# Patient Record
Sex: MALE | Race: WHITE | NOT HISPANIC OR LATINO | Employment: FULL TIME | URBAN - METROPOLITAN AREA
[De-identification: names, ages, dates, MRNs, and addresses within clinical notes are randomized per-mention and may not be internally consistent; named-entity substitution may affect disease eponyms.]

---

## 2020-02-10 ENCOUNTER — APPOINTMENT (OUTPATIENT)
Dept: GENERAL RADIOLOGY | Facility: HOSPITAL | Age: 55
End: 2020-02-10

## 2020-02-10 ENCOUNTER — HOSPITAL ENCOUNTER (EMERGENCY)
Facility: HOSPITAL | Age: 55
Discharge: HOME | End: 2020-02-10
Attending: EMERGENCY MEDICINE | Admitting: EMERGENCY MEDICINE

## 2020-02-10 VITALS
TEMPERATURE: 97.6 F | WEIGHT: 150 LBS | DIASTOLIC BLOOD PRESSURE: 80 MMHG | SYSTOLIC BLOOD PRESSURE: 117 MMHG | HEIGHT: 68 IN | OXYGEN SATURATION: 97 % | RESPIRATION RATE: 18 BRPM | HEART RATE: 65 BPM | BODY MASS INDEX: 22.73 KG/M2

## 2020-02-10 DIAGNOSIS — F43.0 STRESS RESPONSE: ICD-10-CM

## 2020-02-10 DIAGNOSIS — F41.9 ANXIETY: ICD-10-CM

## 2020-02-10 DIAGNOSIS — R20.2 PARESTHESIAS: Primary | ICD-10-CM

## 2020-02-10 LAB
ALBUMIN SERPL-MCNC: 4.5 G/DL (ref 3.5–5.2)
ALBUMIN/GLOB SERPL: 1.5 G/DL
ALP SERPL-CCNC: 78 U/L (ref 39–117)
ALT SERPL W P-5'-P-CCNC: 27 U/L (ref 1–41)
ANION GAP SERPL CALCULATED.3IONS-SCNC: 9 MMOL/L (ref 5–15)
AST SERPL-CCNC: 23 U/L (ref 1–40)
BASOPHILS # BLD AUTO: 0.03 10*3/MM3 (ref 0–0.2)
BASOPHILS NFR BLD AUTO: 0.6 % (ref 0–1.5)
BILIRUB SERPL-MCNC: 0.5 MG/DL (ref 0.2–1.2)
BUN BLD-MCNC: 13 MG/DL (ref 6–20)
BUN/CREAT SERPL: 12.9 (ref 7–25)
CALCIUM SPEC-SCNC: 8.8 MG/DL (ref 8.6–10.5)
CHLORIDE SERPL-SCNC: 101 MMOL/L (ref 98–107)
CO2 SERPL-SCNC: 30 MMOL/L (ref 22–29)
CREAT BLD-MCNC: 1.01 MG/DL (ref 0.76–1.27)
DEPRECATED RDW RBC AUTO: 41.2 FL (ref 37–54)
EOSINOPHIL # BLD AUTO: 0.06 10*3/MM3 (ref 0–0.4)
EOSINOPHIL NFR BLD AUTO: 1.2 % (ref 0.3–6.2)
ERYTHROCYTE [DISTWIDTH] IN BLOOD BY AUTOMATED COUNT: 11.9 % (ref 12.3–15.4)
GFR SERPL CREATININE-BSD FRML MDRD: 78 ML/MIN/1.73
GLOBULIN UR ELPH-MCNC: 3 GM/DL
GLUCOSE BLD-MCNC: 88 MG/DL (ref 65–99)
HCT VFR BLD AUTO: 43.4 % (ref 37.5–51)
HGB BLD-MCNC: 15.3 G/DL (ref 13–17.7)
HOLD SPECIMEN: NORMAL
HOLD SPECIMEN: NORMAL
IMM GRANULOCYTES # BLD AUTO: 0.01 10*3/MM3 (ref 0–0.05)
IMM GRANULOCYTES NFR BLD AUTO: 0.2 % (ref 0–0.5)
LYMPHOCYTES # BLD AUTO: 1.92 10*3/MM3 (ref 0.7–3.1)
LYMPHOCYTES NFR BLD AUTO: 37.9 % (ref 19.6–45.3)
MCH RBC QN AUTO: 33.1 PG (ref 26.6–33)
MCHC RBC AUTO-ENTMCNC: 35.3 G/DL (ref 31.5–35.7)
MCV RBC AUTO: 93.9 FL (ref 79–97)
MONOCYTES # BLD AUTO: 0.59 10*3/MM3 (ref 0.1–0.9)
MONOCYTES NFR BLD AUTO: 11.6 % (ref 5–12)
NEUTROPHILS # BLD AUTO: 2.46 10*3/MM3 (ref 1.7–7)
NEUTROPHILS NFR BLD AUTO: 48.5 % (ref 42.7–76)
NRBC BLD AUTO-RTO: 0 /100 WBC (ref 0–0.2)
NT-PROBNP SERPL-MCNC: <5 PG/ML (ref 5–900)
PLATELET # BLD AUTO: 184 10*3/MM3 (ref 140–450)
PMV BLD AUTO: 9.2 FL (ref 6–12)
POTASSIUM BLD-SCNC: 3.4 MMOL/L (ref 3.5–5.2)
PROT SERPL-MCNC: 7.5 G/DL (ref 6–8.5)
RBC # BLD AUTO: 4.62 10*6/MM3 (ref 4.14–5.8)
SODIUM BLD-SCNC: 140 MMOL/L (ref 136–145)
TROPONIN T SERPL-MCNC: <0.01 NG/ML (ref 0–0.03)
WBC NRBC COR # BLD: 5.07 10*3/MM3 (ref 3.4–10.8)
WHOLE BLOOD HOLD SPECIMEN: NORMAL
WHOLE BLOOD HOLD SPECIMEN: NORMAL

## 2020-02-10 PROCEDURE — 85025 COMPLETE CBC W/AUTO DIFF WBC: CPT | Performed by: EMERGENCY MEDICINE

## 2020-02-10 PROCEDURE — 93005 ELECTROCARDIOGRAM TRACING: CPT | Performed by: EMERGENCY MEDICINE

## 2020-02-10 PROCEDURE — 99284 EMERGENCY DEPT VISIT MOD MDM: CPT

## 2020-02-10 PROCEDURE — 71045 X-RAY EXAM CHEST 1 VIEW: CPT

## 2020-02-10 PROCEDURE — 83880 ASSAY OF NATRIURETIC PEPTIDE: CPT | Performed by: EMERGENCY MEDICINE

## 2020-02-10 PROCEDURE — 84484 ASSAY OF TROPONIN QUANT: CPT | Performed by: EMERGENCY MEDICINE

## 2020-02-10 PROCEDURE — 80053 COMPREHEN METABOLIC PANEL: CPT | Performed by: EMERGENCY MEDICINE

## 2020-02-10 RX ORDER — SODIUM CHLORIDE 0.9 % (FLUSH) 0.9 %
10 SYRINGE (ML) INJECTION AS NEEDED
Status: DISCONTINUED | OUTPATIENT
Start: 2020-02-10 | End: 2020-02-10 | Stop reason: HOSPADM

## 2020-02-10 RX ORDER — HYDROXYZINE PAMOATE 50 MG/1
50 CAPSULE ORAL 3 TIMES DAILY PRN
Qty: 20 CAPSULE | Refills: 0 | Status: SHIPPED | OUTPATIENT
Start: 2020-02-10 | End: 2022-09-25

## 2020-02-10 NOTE — DISCHARGE INSTRUCTIONS
Take hydroxyzine as needed for anxiety.      Rest and drink plenty of fluids.     Follow-up with PCP for recheck within next week.     Engage in activity to minimize stress.

## 2020-02-10 NOTE — ED PROVIDER NOTES
Subjective   Mr. Hiram Stuart is a 54 y.o. male who presents to the ED with c/o shortness of breath. He reports waking up with congestion and itchy eyes. He notes that the pruritus began yesterday, but notes new current symptoms of dry mouth, tingling lips, and diaphoresis. He took a 24-hour Claritin at 0515 this morning. He also complains of lightheadedness, palpitations, generalized weakness, and tremors, but denies vomiting, diarrhea, blood in stool, chest pain, and fever. He reports that he ate chicken yesterday, cooked in peanut oil and believes this could be an allergic reaction. Notes constant stress at work, He denies a history of blood clots and heart attack. He notes a hernia repair and kidney surgery, but denies having a cardiac stent placed. There are no other acute complaints at this time.        History provided by:  Patient   used: No    Shortness of Breath   Severity:  Moderate  Onset quality:  Sudden  Duration:  2 hours  Timing:  Constant  Progression:  Unable to specify  Chronicity:  New  Relieved by:  Nothing  Worsened by:  Nothing  Ineffective treatments: Claritin.  Associated symptoms: no chest pain, no fever and no vomiting    Risk factors: no recent alcohol use, no hx of PE/DVT and no tobacco use        Review of Systems   Constitutional: Negative for fever.   HENT:        Positive for frequent bowel movements.   Respiratory: Positive for shortness of breath.    Cardiovascular: Positive for palpitations. Negative for chest pain.   Gastrointestinal: Negative for blood in stool, diarrhea and vomiting.   Neurological: Positive for tremors, weakness (generalized) and light-headedness.        Positive for itchiness (eyes)  Positive for tingling (lips, hands)   All other systems reviewed and are negative.      Past Medical History:   Diagnosis Date   • Anxiety    • Hyperlipidemia        Allergies   Allergen Reactions   • Penicillins Shortness Of Breath   • Augmentin  [Amoxicillin-Pot Clavulanate] Rash   • Bactrim [Sulfamethoxazole-Trimethoprim] Rash   • Bentyl [Dicyclomine Hcl] Rash   • Contrast Dye Rash   • Zithromax [Azithromycin] Rash       History reviewed. No pertinent surgical history.    History reviewed. No pertinent family history.    Social History     Socioeconomic History   • Marital status:      Spouse name: Not on file   • Number of children: Not on file   • Years of education: Not on file   • Highest education level: Not on file   Tobacco Use   • Smoking status: Never Smoker   • Smokeless tobacco: Never Used   Substance and Sexual Activity   • Alcohol use: Not Currently   • Drug use: Not Currently         Objective   Physical Exam   Constitutional: He is oriented to person, place, and time. He appears well-developed and well-nourished. No distress.   HENT:   Head: Normocephalic and atraumatic.   Nose: Nose normal.   Eyes: Conjunctivae are normal. No scleral icterus.   Neck: Normal range of motion. Neck supple.   Cardiovascular: Normal rate, regular rhythm and normal heart sounds.   No murmur heard.  Pulmonary/Chest: Effort normal and breath sounds normal. No respiratory distress.   No edema of the lower extremities.   There is equal calf size.   Abdominal: Soft. There is no tenderness.   Musculoskeletal: Normal range of motion.   Neurological: He is alert and oriented to person, place, and time.   Skin: Skin is warm and dry.   Psychiatric: His behavior is normal. His mood appears anxious.   The patient is tremulous and anxious appearing.   Nursing note and vitals reviewed.      Procedures         ED Course  ED Course as of Feb 10 1136   Mon Feb 10, 2020   1041 Dr. Dhaliwal is at bedside reevaluating and updating the patient.     [KO]      ED Course User Index  [KO] Tomasa Jackson     Recent Results (from the past 24 hour(s))   Comprehensive Metabolic Panel    Collection Time: 02/10/20  8:37 AM   Result Value Ref Range    Glucose 88 65 - 99 mg/dL    BUN 13  6 - 20 mg/dL    Creatinine 1.01 0.76 - 1.27 mg/dL    Sodium 140 136 - 145 mmol/L    Potassium 3.4 (L) 3.5 - 5.2 mmol/L    Chloride 101 98 - 107 mmol/L    CO2 30.0 (H) 22.0 - 29.0 mmol/L    Calcium 8.8 8.6 - 10.5 mg/dL    Total Protein 7.5 6.0 - 8.5 g/dL    Albumin 4.50 3.50 - 5.20 g/dL    ALT (SGPT) 27 1 - 41 U/L    AST (SGOT) 23 1 - 40 U/L    Alkaline Phosphatase 78 39 - 117 U/L    Total Bilirubin 0.5 0.2 - 1.2 mg/dL    eGFR Non African Amer 78 >60 mL/min/1.73    Globulin 3.0 gm/dL    A/G Ratio 1.5 g/dL    BUN/Creatinine Ratio 12.9 7.0 - 25.0    Anion Gap 9.0 5.0 - 15.0 mmol/L   BNP    Collection Time: 02/10/20  8:37 AM   Result Value Ref Range    proBNP <5.0 (L) 5.0 - 900.0 pg/mL   Troponin    Collection Time: 02/10/20  8:37 AM   Result Value Ref Range    Troponin T <0.010 0.000 - 0.030 ng/mL   Light Blue Top    Collection Time: 02/10/20  8:37 AM   Result Value Ref Range    Extra Tube hold for add-on    Green Top (Gel)    Collection Time: 02/10/20  8:37 AM   Result Value Ref Range    Extra Tube Hold for add-ons.    Lavender Top    Collection Time: 02/10/20  8:37 AM   Result Value Ref Range    Extra Tube hold for add-on    Gold Top - SST    Collection Time: 02/10/20  8:37 AM   Result Value Ref Range    Extra Tube Hold for add-ons.    CBC Auto Differential    Collection Time: 02/10/20  8:37 AM   Result Value Ref Range    WBC 5.07 3.40 - 10.80 10*3/mm3    RBC 4.62 4.14 - 5.80 10*6/mm3    Hemoglobin 15.3 13.0 - 17.7 g/dL    Hematocrit 43.4 37.5 - 51.0 %    MCV 93.9 79.0 - 97.0 fL    MCH 33.1 (H) 26.6 - 33.0 pg    MCHC 35.3 31.5 - 35.7 g/dL    RDW 11.9 (L) 12.3 - 15.4 %    RDW-SD 41.2 37.0 - 54.0 fl    MPV 9.2 6.0 - 12.0 fL    Platelets 184 140 - 450 10*3/mm3    Neutrophil % 48.5 42.7 - 76.0 %    Lymphocyte % 37.9 19.6 - 45.3 %    Monocyte % 11.6 5.0 - 12.0 %    Eosinophil % 1.2 0.3 - 6.2 %    Basophil % 0.6 0.0 - 1.5 %    Immature Grans % 0.2 0.0 - 0.5 %    Neutrophils, Absolute 2.46 1.70 - 7.00 10*3/mm3     Lymphocytes, Absolute 1.92 0.70 - 3.10 10*3/mm3    Monocytes, Absolute 0.59 0.10 - 0.90 10*3/mm3    Eosinophils, Absolute 0.06 0.00 - 0.40 10*3/mm3    Basophils, Absolute 0.03 0.00 - 0.20 10*3/mm3    Immature Grans, Absolute 0.01 0.00 - 0.05 10*3/mm3    nRBC 0.0 0.0 - 0.2 /100 WBC     Note: In addition to lab results from this visit, the labs listed above may include labs taken at another facility or during a different encounter within the last 24 hours. Please correlate lab times with ED admission and discharge times for further clarification of the services performed during this visit.    XR Chest 1 View   Preliminary Result   No evidence of active airspace disease.       D:  02/10/2020   E:  02/10/2020                Vitals:    02/10/20 1000 02/10/20 1021 02/10/20 1030 02/10/20 1130   BP: 137/88 158/86 136/89 117/80   BP Location:       Patient Position:       Pulse: 68 69  65   Resp:       Temp:       TempSrc:       SpO2: 98% 99%  97%   Weight:       Height:         Medications   sodium chloride 0.9 % flush 10 mL (has no administration in time range)     ECG/EMG Results (last 24 hours)     Procedure Component Value Units Date/Time    ECG 12 Lead [[id number]] Collected:  02/10/20 0836     Updated:  02/10/20 0859    ECG 12 Lead [[id number]] Collected:  02/10/20 0838     Updated:  02/10/20 0900        ECG 12 Lead         ECG 12 Lead                                                        MDM  Number of Diagnoses or Management Options  Anxiety: new and requires workup  Paresthesias: new and requires workup  Stress response: new and requires workup  Diagnosis management comments: Is concerning for anxiety and stress response.  He reports paresthesias and appears mildly tremulous on initial evaluation.    His vital signs have remained stable and all laboratory work-up has been normal in appearance.  Normal EKG troponin and chest x-ray.    On repeat presentation the patient appears better reports that he feels better.    I  feel stress and anxiety is contributing significantly to his current symptoms.  He does report increased stress at work and feels like he has had increase as that he secondarily.    We discharged with hydroxyzine, advised to follow-up with primary care physician for outpatient evaluation.       Amount and/or Complexity of Data Reviewed  Clinical lab tests: ordered and reviewed  Tests in the radiology section of CPT®: ordered and reviewed  Review and summarize past medical records: yes  Independent visualization of images, tracings, or specimens: yes        Final diagnoses:   Paresthesias   Anxiety   Stress response       Documentation assistance provided by pilar Jackson.  Information recorded by the pilar was done at my direction and has been verified and validated by me.     Tomasa Jackson  02/10/20 8529       aGyle Dhaliwal MD  02/10/20 1694

## 2022-09-25 PROCEDURE — U0004 COV-19 TEST NON-CDC HGH THRU: HCPCS | Performed by: NURSE PRACTITIONER

## 2023-09-28 ENCOUNTER — HOSPITAL ENCOUNTER (EMERGENCY)
Facility: HOSPITAL | Age: 58
Discharge: HOME | End: 2023-09-28
Attending: EMERGENCY MEDICINE
Payer: COMMERCIAL

## 2023-09-28 ENCOUNTER — APPOINTMENT (OUTPATIENT)
Dept: GENERAL RADIOLOGY | Facility: HOSPITAL | Age: 58
End: 2023-09-28
Payer: COMMERCIAL

## 2023-09-28 VITALS
TEMPERATURE: 98.4 F | SYSTOLIC BLOOD PRESSURE: 132 MMHG | DIASTOLIC BLOOD PRESSURE: 80 MMHG | OXYGEN SATURATION: 100 % | BODY MASS INDEX: 21.07 KG/M2 | HEART RATE: 63 BPM | WEIGHT: 139 LBS | RESPIRATION RATE: 17 BRPM | HEIGHT: 68 IN

## 2023-09-28 DIAGNOSIS — R00.2 PALPITATIONS: ICD-10-CM

## 2023-09-28 DIAGNOSIS — R53.1 GENERALIZED WEAKNESS: Primary | ICD-10-CM

## 2023-09-28 LAB
ALBUMIN SERPL-MCNC: 5 G/DL (ref 3.5–5.2)
ALBUMIN/GLOB SERPL: 1.7 G/DL
ALP SERPL-CCNC: 91 U/L (ref 39–117)
ALT SERPL W P-5'-P-CCNC: 25 U/L (ref 1–41)
ANION GAP SERPL CALCULATED.3IONS-SCNC: 13.5 MMOL/L (ref 5–15)
AST SERPL-CCNC: 20 U/L (ref 1–40)
BASOPHILS # BLD AUTO: 0.03 10*3/MM3 (ref 0–0.2)
BASOPHILS NFR BLD AUTO: 0.2 % (ref 0–1.5)
BILIRUB SERPL-MCNC: 0.6 MG/DL (ref 0–1.2)
BILIRUB UR QL STRIP: NEGATIVE
BUN SERPL-MCNC: 16 MG/DL (ref 6–20)
BUN/CREAT SERPL: 14 (ref 7–25)
CALCIUM SPEC-SCNC: 9.9 MG/DL (ref 8.6–10.5)
CHLORIDE SERPL-SCNC: 100 MMOL/L (ref 98–107)
CLARITY UR: CLEAR
CO2 SERPL-SCNC: 26.5 MMOL/L (ref 22–29)
COLOR UR: YELLOW
CREAT SERPL-MCNC: 1.14 MG/DL (ref 0.76–1.27)
DEPRECATED RDW RBC AUTO: 42.5 FL (ref 37–54)
EGFRCR SERPLBLD CKD-EPI 2021: 76 ML/MIN/1.73
EOSINOPHIL # BLD AUTO: 0.01 10*3/MM3 (ref 0–0.4)
EOSINOPHIL NFR BLD AUTO: 0.1 % (ref 0.3–6.2)
ERYTHROCYTE [DISTWIDTH] IN BLOOD BY AUTOMATED COUNT: 12.8 % (ref 12.3–15.4)
FLUAV SUBTYP SPEC NAA+PROBE: NOT DETECTED
FLUBV RNA ISLT QL NAA+PROBE: NOT DETECTED
GLOBULIN UR ELPH-MCNC: 2.9 GM/DL
GLUCOSE SERPL-MCNC: 110 MG/DL (ref 65–99)
GLUCOSE UR STRIP-MCNC: NEGATIVE MG/DL
HCT VFR BLD AUTO: 45.6 % (ref 37.5–51)
HGB BLD-MCNC: 16.2 G/DL (ref 13–17.7)
HGB UR QL STRIP.AUTO: NEGATIVE
HOLD SPECIMEN: NORMAL
HOLD SPECIMEN: NORMAL
IMM GRANULOCYTES # BLD AUTO: 0.07 10*3/MM3 (ref 0–0.05)
IMM GRANULOCYTES NFR BLD AUTO: 0.6 % (ref 0–0.5)
KETONES UR QL STRIP: NEGATIVE
LEUKOCYTE ESTERASE UR QL STRIP.AUTO: NEGATIVE
LYMPHOCYTES # BLD AUTO: 1.33 10*3/MM3 (ref 0.7–3.1)
LYMPHOCYTES NFR BLD AUTO: 10.9 % (ref 19.6–45.3)
MAGNESIUM SERPL-MCNC: 2.1 MG/DL (ref 1.6–2.6)
MCH RBC QN AUTO: 32.5 PG (ref 26.6–33)
MCHC RBC AUTO-ENTMCNC: 35.5 G/DL (ref 31.5–35.7)
MCV RBC AUTO: 91.4 FL (ref 79–97)
MONOCYTES # BLD AUTO: 0.48 10*3/MM3 (ref 0.1–0.9)
MONOCYTES NFR BLD AUTO: 4 % (ref 5–12)
NEUTROPHILS NFR BLD AUTO: 10.23 10*3/MM3 (ref 1.7–7)
NEUTROPHILS NFR BLD AUTO: 84.2 % (ref 42.7–76)
NITRITE UR QL STRIP: NEGATIVE
NRBC BLD AUTO-RTO: 0 /100 WBC (ref 0–0.2)
PH UR STRIP.AUTO: 6 [PH] (ref 5–8)
PLATELET # BLD AUTO: 251 10*3/MM3 (ref 140–450)
PMV BLD AUTO: 9.7 FL (ref 6–12)
POTASSIUM SERPL-SCNC: 3.9 MMOL/L (ref 3.5–5.2)
PROT SERPL-MCNC: 7.9 G/DL (ref 6–8.5)
PROT UR QL STRIP: NEGATIVE
RBC # BLD AUTO: 4.99 10*6/MM3 (ref 4.14–5.8)
SARS-COV-2 RNA RESP QL NAA+PROBE: NOT DETECTED
SODIUM SERPL-SCNC: 140 MMOL/L (ref 136–145)
SP GR UR STRIP: 1.01 (ref 1–1.03)
TROPONIN T SERPL HS-MCNC: <6 NG/L
UROBILINOGEN UR QL STRIP: NORMAL
WBC NRBC COR # BLD: 12.15 10*3/MM3 (ref 3.4–10.8)
WHOLE BLOOD HOLD COAG: NORMAL
WHOLE BLOOD HOLD SPECIMEN: NORMAL

## 2023-09-28 PROCEDURE — 81003 URINALYSIS AUTO W/O SCOPE: CPT

## 2023-09-28 PROCEDURE — 85025 COMPLETE CBC W/AUTO DIFF WBC: CPT

## 2023-09-28 PROCEDURE — 80053 COMPREHEN METABOLIC PANEL: CPT

## 2023-09-28 PROCEDURE — 87636 SARSCOV2 & INF A&B AMP PRB: CPT | Performed by: EMERGENCY MEDICINE

## 2023-09-28 PROCEDURE — 71045 X-RAY EXAM CHEST 1 VIEW: CPT

## 2023-09-28 PROCEDURE — 93005 ELECTROCARDIOGRAM TRACING: CPT

## 2023-09-28 PROCEDURE — 99284 EMERGENCY DEPT VISIT MOD MDM: CPT

## 2023-09-28 PROCEDURE — 83735 ASSAY OF MAGNESIUM: CPT

## 2023-09-28 PROCEDURE — 84484 ASSAY OF TROPONIN QUANT: CPT

## 2023-09-28 RX ORDER — SODIUM CHLORIDE 0.9 % (FLUSH) 0.9 %
10 SYRINGE (ML) INJECTION AS NEEDED
Status: DISCONTINUED | OUTPATIENT
Start: 2023-09-28 | End: 2023-09-28 | Stop reason: HOSPADM

## 2023-09-28 RX ADMIN — SODIUM CHLORIDE 1000 ML: 9 INJECTION, SOLUTION INTRAVENOUS at 17:55

## 2023-09-28 NOTE — ED PROVIDER NOTES
"Subjective  History of Present Illness:    Chief Complaint: ***hot flashes, palpiataitons, shaky, ? prednisone    History of Present Illness: ***    Nurses Notes reviewed and agree, including vitals, allergies, social history and prior medical history.     REVIEW OF SYSTEMS: All systems reviewed and not pertinent unless noted.  Review of Systems      Positive for:  ***    Negative for:***    Past Medical History:   Diagnosis Date    Anxiety     Hyperlipidemia     Lumbar degenerative disc disease        Allergies:    Penicillins, Augmentin [amoxicillin-pot clavulanate], Bactrim [sulfamethoxazole-trimethoprim], Bentyl [dicyclomine hcl], Contrast dye (echo or unknown ct/mr), and Zithromax [azithromycin]      Past Surgical History:   Procedure Laterality Date    NEPHRECTOMY Right          Social History     Socioeconomic History    Marital status:    Tobacco Use    Smoking status: Never    Smokeless tobacco: Never   Vaping Use    Vaping Use: Never used   Substance and Sexual Activity    Alcohol use: Not Currently    Drug use: Not Currently    Sexual activity: Defer         History reviewed. No pertinent family history.    Objective  Physical Exam:  /88 (BP Location: Left arm, Patient Position: Lying)   Pulse 59   Temp 98.4 °F (36.9 °C) (Oral)   Resp 17   Ht 172.7 cm (68\")   Wt 63 kg (139 lb)   SpO2 100%   BMI 21.13 kg/m²      Physical Exam    CONSTITUTIONAL: Well developed, ***,  in ***.  VITAL SIGNS: per nursing, reviewed and noted  SKIN: exposed skin with no rashes, ulcerations or petechiae  EYES: Grossly EOMI, no icterus  ENT: Normal voice.  Moist mucous membranes   RESPIRATORY:  No increased work of breathing. No retractions.   CARDIOVASCULAR:   Extremities pink and warm.  Good cap refill to extremities.   GI: Abdomen without distention   MUSCULOSKELETAL: Age-appropriate bulk and tone, moves all 4 extremities  NEUROLOGIC: Alert, oriented x 3. No gross deficits. GCS 15.   PSYCH: appropriate " affect.  : no bladder tenderness or distention, no CVA tenderness    Procedures    ED Course:    Lab Results (last 24 hours)       Procedure Component Value Units Date/Time    CBC & Differential [[id number]]  (Abnormal) Collected: 09/28/23 1712    Specimen: Blood Updated: 09/28/23 1721    Narrative:      The following orders were created for panel order CBC & Differential.  Procedure                               Abnormality         Status                     ---------                               -----------         ------                     CBC Auto Differential[[id number]]        Abnormal            Final result                 Please view results for these tests on the individual orders.    Comprehensive Metabolic Panel [[id number]]  (Abnormal) Collected: 09/28/23 1712    Specimen: Blood Updated: 09/28/23 1737     Glucose 110 mg/dL      BUN 16 mg/dL      Creatinine 1.14 mg/dL      Sodium 140 mmol/L      Potassium 3.9 mmol/L      Chloride 100 mmol/L      CO2 26.5 mmol/L      Calcium 9.9 mg/dL      Total Protein 7.9 g/dL      Albumin 5.0 g/dL      ALT (SGPT) 25 U/L      AST (SGOT) 20 U/L      Alkaline Phosphatase 91 U/L      Total Bilirubin 0.6 mg/dL      Globulin 2.9 gm/dL      A/G Ratio 1.7 g/dL      BUN/Creatinine Ratio 14.0     Anion Gap 13.5 mmol/L      eGFR 76.0 mL/min/1.73     Narrative:      GFR Normal >60  Chronic Kidney Disease <60  Kidney Failure <15      Single High Sensitivity Troponin T [[id number]]  (Normal) Collected: 09/28/23 1712    Specimen: Blood Updated: 09/28/23 1740     HS Troponin T <6 ng/L     Narrative:      High Sensitive Troponin T Reference Range:  <10.0 ng/L- Negative Female for AMI  <15.0 ng/L- Negative Male for AMI  >=10 - Abnormal Female indicating possible myocardial injury.  >=15 - Abnormal Male indicating possible myocardial injury.   Clinicians would have to utilize clinical acumen, EKG, Troponin, and serial changes to determine if it is an Acute Myocardial Infarction or  myocardial injury due to an underlying chronic condition.         Magnesium [[id number]]  (Normal) Collected: 09/28/23 1712    Specimen: Blood Updated: 09/28/23 1737     Magnesium 2.1 mg/dL     CBC Auto Differential [[id number]]  (Abnormal) Collected: 09/28/23 1712    Specimen: Blood Updated: 09/28/23 1721     WBC 12.15 10*3/mm3      RBC 4.99 10*6/mm3      Hemoglobin 16.2 g/dL      Hematocrit 45.6 %      MCV 91.4 fL      MCH 32.5 pg      MCHC 35.5 g/dL      RDW 12.8 %      RDW-SD 42.5 fl      MPV 9.7 fL      Platelets 251 10*3/mm3      Neutrophil % 84.2 %      Lymphocyte % 10.9 %      Monocyte % 4.0 %      Eosinophil % 0.1 %      Basophil % 0.2 %      Immature Grans % 0.6 %      Neutrophils, Absolute 10.23 10*3/mm3      Lymphocytes, Absolute 1.33 10*3/mm3      Monocytes, Absolute 0.48 10*3/mm3      Eosinophils, Absolute 0.01 10*3/mm3      Basophils, Absolute 0.03 10*3/mm3      Immature Grans, Absolute 0.07 10*3/mm3      nRBC 0.0 /100 WBC     Urinalysis With Microscopic If Indicated (No Culture) - Urine, Clean Catch [[id number]]  (Normal) Collected: 09/28/23 1748    Specimen: Urine, Clean Catch Updated: 09/28/23 1755     Color, UA Yellow     Appearance, UA Clear     pH, UA 6.0     Specific Gravity, UA 1.012     Glucose, UA Negative     Ketones, UA Negative     Bilirubin, UA Negative     Blood, UA Negative     Protein, UA Negative     Leuk Esterase, UA Negative     Nitrite, UA Negative     Urobilinogen, UA 0.2 E.U./dL    Narrative:      Urine microscopic not indicated.    COVID-19 and FLU A/B PCR - Swab, Nasopharynx [[id number]]  (Normal) Collected: 09/28/23 1754    Specimen: Swab from Nasopharynx Updated: 09/28/23 1821     COVID19 Not Detected     Influenza A PCR Not Detected     Influenza B PCR Not Detected    Narrative:      Fact sheet for providers: https://www.fda.gov/media/727133/download    Fact sheet for patients: https://www.fda.gov/media/846385/download    Test performed by PCR.             No radiology results  from the last 24 hrs     [facility]     Amount and/or Complexity of Data Reviewed  Clinical lab tests: reviewed  Tests in the radiology section of CPT®: reviewed  Tests in the medicine section of CPT®: reviewed  Independent visualization of images, tracings, or specimens: yes        ED Course as of 09/28/23 1929   Thu Sep 28, 2023   1713 EKG interpreted by me reveals sinus rhythm rate 58 no ectopy no ischemic changes [PF]      ED Course User Index  [PF] Luigi Pablo, DO       Medical Decision Making:    Initial impression of presenting illness: ***    DDX: includes but is not limited to: ***         Patient arrives *** with vitals interpreted by myself.     Pertinent features from physical exam: ***.    Initial diagnostic plan: ***    Results from initial plan were reviewed and interpreted by me revealing ***    Diagnostic information from other sources: ***    Interventions / Re-evaluation: ***    Results/clinical rationale were discussed with ***    Consultations/Discussion of results with other physicians: ***    Disposition plan: ***  -----      Final diagnoses:   Generalized weakness   Palpitations          diagnoses:   Generalized weakness   Palpitations            Luigi Pablo, DO  10/11/23 0303

## 2025-02-02 ENCOUNTER — PATIENT ROUNDING (BHMG ONLY) (OUTPATIENT)
Dept: URGENT CARE | Facility: CLINIC | Age: 60
End: 2025-02-02
Payer: COMMERCIAL

## 2025-04-29 ENCOUNTER — HOSPITAL ENCOUNTER (EMERGENCY)
Facility: HOSPITAL | Age: 60
Discharge: HOME | End: 2025-04-29
Attending: STUDENT IN AN ORGANIZED HEALTH CARE EDUCATION/TRAINING PROGRAM
Payer: COMMERCIAL

## 2025-04-29 ENCOUNTER — APPOINTMENT (OUTPATIENT)
Dept: GENERAL RADIOLOGY | Facility: HOSPITAL | Age: 60
End: 2025-04-29
Payer: COMMERCIAL

## 2025-04-29 VITALS
HEIGHT: 67 IN | WEIGHT: 125 LBS | HEART RATE: 61 BPM | RESPIRATION RATE: 18 BRPM | SYSTOLIC BLOOD PRESSURE: 142 MMHG | BODY MASS INDEX: 19.62 KG/M2 | DIASTOLIC BLOOD PRESSURE: 88 MMHG | TEMPERATURE: 97.9 F | OXYGEN SATURATION: 99 %

## 2025-04-29 DIAGNOSIS — B34.9 VIRAL ILLNESS: Primary | ICD-10-CM

## 2025-04-29 LAB
ALBUMIN SERPL-MCNC: 5.2 G/DL (ref 3.5–5.2)
ALBUMIN/GLOB SERPL: 1.7 G/DL
ALP SERPL-CCNC: 79 U/L (ref 39–117)
ALT SERPL W P-5'-P-CCNC: 22 U/L (ref 1–41)
ANION GAP SERPL CALCULATED.3IONS-SCNC: 12.1 MMOL/L (ref 5–15)
AST SERPL-CCNC: 20 U/L (ref 1–40)
BASOPHILS # BLD AUTO: 0.01 10*3/MM3 (ref 0–0.2)
BASOPHILS NFR BLD AUTO: 0.2 % (ref 0–1.5)
BILIRUB SERPL-MCNC: 0.6 MG/DL (ref 0–1.2)
BILIRUB UR QL STRIP: NEGATIVE
BUN SERPL-MCNC: 15 MG/DL (ref 6–20)
BUN/CREAT SERPL: 14.9 (ref 7–25)
CALCIUM SPEC-SCNC: 10 MG/DL (ref 8.6–10.5)
CHLORIDE SERPL-SCNC: 101 MMOL/L (ref 98–107)
CK SERPL-CCNC: 72 U/L (ref 20–200)
CLARITY UR: CLEAR
CO2 SERPL-SCNC: 28.9 MMOL/L (ref 22–29)
COLOR UR: YELLOW
CREAT SERPL-MCNC: 1.01 MG/DL (ref 0.76–1.27)
CRP SERPL-MCNC: <0.3 MG/DL (ref 0–0.5)
D-LACTATE SERPL-SCNC: 1.2 MMOL/L (ref 0.5–2)
DEPRECATED RDW RBC AUTO: 42.3 FL (ref 37–54)
EGFRCR SERPLBLD CKD-EPI 2021: 86.7 ML/MIN/1.73
EOSINOPHIL # BLD AUTO: 0.01 10*3/MM3 (ref 0–0.4)
EOSINOPHIL NFR BLD AUTO: 0.2 % (ref 0.3–6.2)
ERYTHROCYTE [DISTWIDTH] IN BLOOD BY AUTOMATED COUNT: 12.4 % (ref 12.3–15.4)
FLUAV RNA RESP QL NAA+PROBE: NOT DETECTED
FLUBV RNA RESP QL NAA+PROBE: NOT DETECTED
GLOBULIN UR ELPH-MCNC: 3 GM/DL
GLUCOSE BLDC GLUCOMTR-MCNC: 101 MG/DL (ref 70–130)
GLUCOSE SERPL-MCNC: 117 MG/DL (ref 65–99)
GLUCOSE UR STRIP-MCNC: NEGATIVE MG/DL
HCT VFR BLD AUTO: 46.3 % (ref 37.5–51)
HGB BLD-MCNC: 16 G/DL (ref 13–17.7)
HGB UR QL STRIP.AUTO: NEGATIVE
HOLD SPECIMEN: NORMAL
HOLD SPECIMEN: NORMAL
IMM GRANULOCYTES # BLD AUTO: 0.01 10*3/MM3 (ref 0–0.05)
IMM GRANULOCYTES NFR BLD AUTO: 0.2 % (ref 0–0.5)
KETONES UR QL STRIP: NEGATIVE
LEUKOCYTE ESTERASE UR QL STRIP.AUTO: NEGATIVE
LYMPHOCYTES # BLD AUTO: 0.86 10*3/MM3 (ref 0.7–3.1)
LYMPHOCYTES NFR BLD AUTO: 15.1 % (ref 19.6–45.3)
MAGNESIUM SERPL-MCNC: 2.1 MG/DL (ref 1.6–2.6)
MCH RBC QN AUTO: 31.8 PG (ref 26.6–33)
MCHC RBC AUTO-ENTMCNC: 34.6 G/DL (ref 31.5–35.7)
MCV RBC AUTO: 92 FL (ref 79–97)
MONOCYTES # BLD AUTO: 0.24 10*3/MM3 (ref 0.1–0.9)
MONOCYTES NFR BLD AUTO: 4.2 % (ref 5–12)
NEUTROPHILS NFR BLD AUTO: 4.56 10*3/MM3 (ref 1.7–7)
NEUTROPHILS NFR BLD AUTO: 80.1 % (ref 42.7–76)
NITRITE UR QL STRIP: NEGATIVE
NRBC BLD AUTO-RTO: 0 /100 WBC (ref 0–0.2)
NT-PROBNP SERPL-MCNC: 97 PG/ML (ref 0–900)
PH UR STRIP.AUTO: 5.5 [PH] (ref 5–8)
PLATELET # BLD AUTO: 213 10*3/MM3 (ref 140–450)
PMV BLD AUTO: 9.3 FL (ref 6–12)
POTASSIUM SERPL-SCNC: 4.2 MMOL/L (ref 3.5–5.2)
PROCALCITONIN SERPL-MCNC: 0.04 NG/ML (ref 0–0.25)
PROT SERPL-MCNC: 8.2 G/DL (ref 6–8.5)
PROT UR QL STRIP: NEGATIVE
RBC # BLD AUTO: 5.03 10*6/MM3 (ref 4.14–5.8)
SARS-COV-2 RNA RESP QL NAA+PROBE: NOT DETECTED
SODIUM SERPL-SCNC: 142 MMOL/L (ref 136–145)
SP GR UR STRIP: <=1.005 (ref 1–1.03)
TROPONIN T SERPL HS-MCNC: <6 NG/L
UROBILINOGEN UR QL STRIP: NORMAL
WBC NRBC COR # BLD AUTO: 5.69 10*3/MM3 (ref 3.4–10.8)
WHOLE BLOOD HOLD COAG: NORMAL
WHOLE BLOOD HOLD SPECIMEN: NORMAL

## 2025-04-29 PROCEDURE — 93005 ELECTROCARDIOGRAM TRACING: CPT | Performed by: STUDENT IN AN ORGANIZED HEALTH CARE EDUCATION/TRAINING PROGRAM

## 2025-04-29 PROCEDURE — 71045 X-RAY EXAM CHEST 1 VIEW: CPT

## 2025-04-29 PROCEDURE — 84145 PROCALCITONIN (PCT): CPT | Performed by: STUDENT IN AN ORGANIZED HEALTH CARE EDUCATION/TRAINING PROGRAM

## 2025-04-29 PROCEDURE — 36415 COLL VENOUS BLD VENIPUNCTURE: CPT

## 2025-04-29 PROCEDURE — 81003 URINALYSIS AUTO W/O SCOPE: CPT | Performed by: STUDENT IN AN ORGANIZED HEALTH CARE EDUCATION/TRAINING PROGRAM

## 2025-04-29 PROCEDURE — 83605 ASSAY OF LACTIC ACID: CPT | Performed by: STUDENT IN AN ORGANIZED HEALTH CARE EDUCATION/TRAINING PROGRAM

## 2025-04-29 PROCEDURE — 83880 ASSAY OF NATRIURETIC PEPTIDE: CPT | Performed by: STUDENT IN AN ORGANIZED HEALTH CARE EDUCATION/TRAINING PROGRAM

## 2025-04-29 PROCEDURE — 83735 ASSAY OF MAGNESIUM: CPT | Performed by: STUDENT IN AN ORGANIZED HEALTH CARE EDUCATION/TRAINING PROGRAM

## 2025-04-29 PROCEDURE — 86140 C-REACTIVE PROTEIN: CPT | Performed by: STUDENT IN AN ORGANIZED HEALTH CARE EDUCATION/TRAINING PROGRAM

## 2025-04-29 PROCEDURE — 99284 EMERGENCY DEPT VISIT MOD MDM: CPT | Performed by: STUDENT IN AN ORGANIZED HEALTH CARE EDUCATION/TRAINING PROGRAM

## 2025-04-29 PROCEDURE — 80053 COMPREHEN METABOLIC PANEL: CPT | Performed by: STUDENT IN AN ORGANIZED HEALTH CARE EDUCATION/TRAINING PROGRAM

## 2025-04-29 PROCEDURE — 87636 SARSCOV2 & INF A&B AMP PRB: CPT | Performed by: STUDENT IN AN ORGANIZED HEALTH CARE EDUCATION/TRAINING PROGRAM

## 2025-04-29 PROCEDURE — 82550 ASSAY OF CK (CPK): CPT | Performed by: STUDENT IN AN ORGANIZED HEALTH CARE EDUCATION/TRAINING PROGRAM

## 2025-04-29 PROCEDURE — 82948 REAGENT STRIP/BLOOD GLUCOSE: CPT | Performed by: STUDENT IN AN ORGANIZED HEALTH CARE EDUCATION/TRAINING PROGRAM

## 2025-04-29 PROCEDURE — 84484 ASSAY OF TROPONIN QUANT: CPT | Performed by: STUDENT IN AN ORGANIZED HEALTH CARE EDUCATION/TRAINING PROGRAM

## 2025-04-29 PROCEDURE — 85025 COMPLETE CBC W/AUTO DIFF WBC: CPT | Performed by: STUDENT IN AN ORGANIZED HEALTH CARE EDUCATION/TRAINING PROGRAM

## 2025-04-29 RX ORDER — SODIUM CHLORIDE 0.9 % (FLUSH) 0.9 %
10 SYRINGE (ML) INJECTION AS NEEDED
Status: DISCONTINUED | OUTPATIENT
Start: 2025-04-29 | End: 2025-04-29 | Stop reason: HOSPADM

## 2025-04-29 NOTE — Clinical Note
[facility] EMERGENCY DEPARTMENT  [address] [address]  Phone: [phone]    Hiram Stuart was seen and treated in our emergency department on 4/29/2025.  He may return to work on 05/04/2025.         Thank you for choosing [facility].    Anselmo Sheehan MD

## 2025-04-29 NOTE — ED PROVIDER NOTES
Subjective:  History of Present Illness:    Patient is a 59-year-old male with history of anxiety, hyperlipidemia who presents today with rigors, malaise.  Reports that he awoke this morning, has had rigors and feels ill.  Also endorsing diffuse bodyaches.  Denies any measured fevers.  Does endorse lightheadedness.  No headache.  No difficulties with ambulation.  Denies any chest pain or shortness of breath.  No abdominal pain nausea vomiting or diarrhea.  Denies any dysuria.  No new leg swelling or leg pain.      Nurses Notes reviewed and agree, including vitals, allergies, social history and prior medical history.     REVIEW OF SYSTEMS: All systems reviewed and not pertinent unless noted.  Review of Systems   Constitutional:  Positive for activity change, chills and fatigue. Negative for appetite change and fever.   HENT:  Positive for congestion. Negative for sinus pressure, sneezing and trouble swallowing.    Eyes:  Negative for discharge and itching.   Respiratory:  Negative for cough and shortness of breath.    Cardiovascular:  Negative for chest pain and palpitations.   Gastrointestinal:  Negative for abdominal distention, abdominal pain, diarrhea, nausea and vomiting.   Endocrine: Negative for cold intolerance and heat intolerance.   Genitourinary:  Negative for decreased urine volume, dysuria and urgency.   Musculoskeletal:  Positive for myalgias. Negative for gait problem, neck pain and neck stiffness.   Skin:  Negative for color change and rash.   Allergic/Immunologic: Negative for immunocompromised state.   Neurological:  Negative for facial asymmetry and headaches.   Hematological:  Negative for adenopathy.   Psychiatric/Behavioral:  Negative for self-injury and suicidal ideas.        Past Medical History:   Diagnosis Date    Anxiety     Hyperlipidemia     Lumbar degenerative disc disease        Allergies:    Penicillins, Augmentin [amoxicillin-pot clavulanate], Bactrim [sulfamethoxazole-trimethoprim],  "Bentyl [dicyclomine hcl], Contrast dye (echo or unknown ct/mr), and Zithromax [azithromycin]      Past Surgical History:   Procedure Laterality Date    APPENDECTOMY      HERNIA REPAIR      NEPHRECTOMY Right     SINUS SURGERY           Social History     Socioeconomic History    Marital status:    Tobacco Use    Smoking status: Never    Smokeless tobacco: Never   Vaping Use    Vaping status: Never Used   Substance and Sexual Activity    Alcohol use: Not Currently    Drug use: Not Currently    Sexual activity: Defer         History reviewed. No pertinent family history.    Objective  Physical Exam:  /88   Pulse 61   Temp 97.9 °F (36.6 °C) (Oral)   Resp 18   Ht 170.2 cm (67\")   Wt 56.7 kg (125 lb)   SpO2 99%   BMI 19.58 kg/m²      Physical Exam  Constitutional:       General: He is not in acute distress.     Appearance: Normal appearance. He is normal weight. He is not ill-appearing.   HENT:      Head: Normocephalic and atraumatic.      Nose: Nose normal. No congestion or rhinorrhea.      Mouth/Throat:      Mouth: Mucous membranes are moist.      Pharynx: Oropharynx is clear.   Eyes:      Extraocular Movements: Extraocular movements intact.      Conjunctiva/sclera: Conjunctivae normal.      Pupils: Pupils are equal, round, and reactive to light.   Cardiovascular:      Rate and Rhythm: Normal rate and regular rhythm.      Pulses: Normal pulses.   Pulmonary:      Effort: Pulmonary effort is normal. No respiratory distress.      Breath sounds: Normal breath sounds.   Abdominal:      General: Abdomen is flat. Bowel sounds are normal. There is no distension.      Palpations: Abdomen is soft.      Tenderness: There is no abdominal tenderness. There is no guarding or rebound.   Musculoskeletal:         General: No swelling or tenderness. Normal range of motion.      Cervical back: Normal range of motion and neck supple. No rigidity or tenderness.   Skin:     General: Skin is warm and dry.      Capillary " Refill: Capillary refill takes less than 2 seconds.   Neurological:      General: No focal deficit present.      Mental Status: He is alert and oriented to person, place, and time. Mental status is at baseline.      Cranial Nerves: No cranial nerve deficit.      Sensory: No sensory deficit.      Motor: No weakness.      Coordination: Coordination normal.   Psychiatric:         Mood and Affect: Mood normal.         Behavior: Behavior normal.         Thought Content: Thought content normal.         Judgment: Judgment normal.         Procedures    ED Course:    ED Course as of 04/29/25 1704   Tue Apr 29, 2025   1232 EKG interpreted by me, normal sinus rhythm with no concerning ST changes noted, rate of 61 [JE]      ED Course User Index  [JE] Anselmo Sheehan MD       Lab Results (last 24 hours)       Procedure Component Value Units Date/Time    Urinalysis With Culture If Indicated - Urine, Clean Catch [[id number]]  (Normal) Collected: 04/29/25 1141    Specimen: Urine, Clean Catch Updated: 04/29/25 1153     Color, UA Yellow     Appearance, UA Clear     pH, UA 5.5     Specific Gravity, UA <=1.005     Glucose, UA Negative     Ketones, UA Negative     Bilirubin, UA Negative     Blood, UA Negative     Protein, UA Negative     Leuk Esterase, UA Negative     Nitrite, UA Negative     Urobilinogen, UA 0.2 E.U./dL    Narrative:      In absence of clinical symptoms, the presence of pyuria, bacteria, and/or nitrites on the urinalysis result does not correlate with infection.  Urine microscopic not indicated.    CBC & Differential [[id number]]  (Abnormal) Collected: 04/29/25 1204    Specimen: Blood Updated: 04/29/25 1216    Narrative:      The following orders were created for panel order CBC & Differential.  Procedure                               Abnormality         Status                     ---------                               -----------         ------                     CBC Auto Differential[[id number]]        Abnormal             Final result                 Please view results for these tests on the individual orders.    Comprehensive Metabolic Panel [[id number]]  (Abnormal) Collected: 04/29/25 1204    Specimen: Blood Updated: 04/29/25 1238     Glucose 117 mg/dL      BUN 15 mg/dL      Creatinine 1.01 mg/dL      Sodium 142 mmol/L      Potassium 4.2 mmol/L      Chloride 101 mmol/L      CO2 28.9 mmol/L      Calcium 10.0 mg/dL      Total Protein 8.2 g/dL      Albumin 5.2 g/dL      ALT (SGPT) 22 U/L      AST (SGOT) 20 U/L      Alkaline Phosphatase 79 U/L      Total Bilirubin 0.6 mg/dL      Globulin 3.0 gm/dL      A/G Ratio 1.7 g/dL      BUN/Creatinine Ratio 14.9     Anion Gap 12.1 mmol/L      eGFR 86.7 mL/min/1.73     Narrative:      GFR Categories in Chronic Kidney Disease (CKD)      GFR Category          GFR (mL/min/1.73)    Interpretation  G1                     90 or greater         Normal or high (1)  G2                      60-89                Mild decrease (1)  G3a                   45-59                Mild to moderate decrease  G3b                   30-44                Moderate to severe decrease  G4                    15-29                Severe decrease  G5                    14 or less           Kidney failure          (1)In the absence of evidence of kidney disease, neither GFR category G1 or G2 fulfill the criteria for CKD.    eGFR calculation 2021 CKD-EPI creatinine equation, which does not include race as a factor    High Sensitivity Troponin T [[id number]]  (Normal) Collected: 04/29/25 1204    Specimen: Blood Updated: 04/29/25 1245     HS Troponin T <6 ng/L     Narrative:      High Sensitive Troponin T Reference Range:  <14.0 ng/L- Negative Female for AMI  <22.0 ng/L- Negative Male for AMI  >=14 - Abnormal Female indicating possible myocardial injury.  >=22 - Abnormal Male indicating possible myocardial injury.   Clinicians would have to utilize clinical acumen, EKG, Troponin, and serial changes to determine if it is an  Acute Myocardial Infarction or myocardial injury due to an underlying chronic condition.         Magnesium [[id number]]  (Normal) Collected: 04/29/25 1204    Specimen: Blood Updated: 04/29/25 1238     Magnesium 2.1 mg/dL     CBC Auto Differential [[id number]]  (Abnormal) Collected: 04/29/25 1204    Specimen: Blood Updated: 04/29/25 1216     WBC 5.69 10*3/mm3      RBC 5.03 10*6/mm3      Hemoglobin 16.0 g/dL      Hematocrit 46.3 %      MCV 92.0 fL      MCH 31.8 pg      MCHC 34.6 g/dL      RDW 12.4 %      RDW-SD 42.3 fl      MPV 9.3 fL      Platelets 213 10*3/mm3      Neutrophil % 80.1 %      Lymphocyte % 15.1 %      Monocyte % 4.2 %      Eosinophil % 0.2 %      Basophil % 0.2 %      Immature Grans % 0.2 %      Neutrophils, Absolute 4.56 10*3/mm3      Lymphocytes, Absolute 0.86 10*3/mm3      Monocytes, Absolute 0.24 10*3/mm3      Eosinophils, Absolute 0.01 10*3/mm3      Basophils, Absolute 0.01 10*3/mm3      Immature Grans, Absolute 0.01 10*3/mm3      nRBC 0.0 /100 WBC     BNP [[id number]]  (Normal) Collected: 04/29/25 1204    Specimen: Blood Updated: 04/29/25 1245     proBNP 97.0 pg/mL     Narrative:      This assay is used as an aid in the diagnosis of individuals suspected of having heart failure. It can be used as an aid in the diagnosis of acute decompensated heart failure (ADHF) in patients presenting with signs and symptoms of ADHF to the emergency department (ED). In addition, NT-proBNP of <300 pg/mL indicates ADHF is not likely.    Age Range Result Interpretation  NT-proBNP Concentration (pg/mL:      <50             Positive            >450                   Gray                 300-450                    Negative             <300    50-75           Positive            >900                  Gray                300-900                  Negative            <300      >75             Positive            >1800                  Gray                300-1800                  Negative            <300    C-reactive Protein  "[[id number]]  (Normal) Collected: 04/29/25 1204    Specimen: Blood Updated: 04/29/25 1241     C-Reactive Protein <0.30 mg/dL     Procalcitonin [[id number]]  (Normal) Collected: 04/29/25 1204    Specimen: Blood Updated: 04/29/25 1251     Procalcitonin 0.04 ng/mL     Narrative:      As a Marker for Sepsis (Non-Neonates):    1. <0.5 ng/mL represents a low risk of severe sepsis and/or septic shock.  2. >2 ng/mL represents a high risk of severe sepsis and/or septic shock.    As a Marker for Lower Respiratory Tract Infections that require antibiotic therapy:    PCT on Admission    Antibiotic Therapy       6-12 Hrs later    >0.5                Strongly Recommended  >0.25 - <0.5        Recommended   0.1 - 0.25          Discouraged              Remeasure/reassess PCT  <0.1                Strongly Discouraged     Remeasure/reassess PCT    As 28 day mortality risk marker: \"Change in Procalcitonin Result\" (>80% or <=80%) if Day 0 (or Day 1) and Day 4 values are available. Refer to http://www.[employer]-pct-calculator.com    Change in PCT <=80%  A decrease of PCT levels below or equal to 80% defines a positive change in PCT test result representing a higher risk for 28-day all-cause mortality of patients diagnosed with severe sepsis for septic shock.    Change in PCT >80%  A decrease of PCT levels of more than 80% defines a negative change in PCT result representing a lower risk for 28-day all-cause mortality of patients diagnosed with severe sepsis or septic shock.       Lactic Acid, Plasma [[id number]]  (Normal) Collected: 04/29/25 1204    Specimen: Blood Updated: 04/29/25 1238     Lactate 1.2 mmol/L     CK [[id number]]  (Normal) Collected: 04/29/25 1204    Specimen: Blood Updated: 04/29/25 1238     Creatine Kinase 72 U/L     COVID-19 and FLU A/B PCR, 1 HR TAT - Swab, Nasopharynx [[id number]]  (Normal) Collected: 04/29/25 1206    Specimen: Swab from Nasopharynx Updated: 04/29/25 1238     COVID19 Not Detected     Influenza A PCR Not " Detected     Influenza B PCR Not Detected    Narrative:      Fact sheet for providers: https://www.fda.gov/media/944149/download    Fact sheet for patients: https://www.fda.gov/media/428585/download    Test performed by PCR.    POC Glucose Once [[id number]]  (Normal) Collected: 04/29/25 1214    Specimen: Blood Updated: 04/29/25 1224     Glucose 101 mg/dL      Comment: Serial Number: [id number]:  [id number]                XR Chest 1 View  Result Date: 4/29/2025  PROCEDURE: XR CHEST 1 VW-  HISTORY: Weak/Dizzy/AMS triage protocol, hypertension  COMPARISON: 9/28/2023  FINDINGS:  Portable view of the chest demonstrates the lungs to be grossly clear. There is no evidence of effusion, pneumothorax or other significant pleural disease. The mediastinum is unremarkable.  The heart size is normal.      Impression: Unremarkable portable chest.    This report was signed and finalized on 4/29/2025 12:45 PM by Ricardo Steven MD.           MDM      Initial impression of presenting illness: Malaise    DDX: includes but is not limited to: Viral URI, bacterial pneumonia, ACS, MI, sepsis    Patient arrives stable with vitals interpreted by myself.     Pertinent features from physical exam: Clear to auscultation, regular rate and rhythm, no murmur, nontender to abdominal palpation.    Initial diagnostic plan: CBC, CMP, UA, troponin, BNP, EKG, chest x-ray, CRP, Pro-Jc, lactic acid, magnesium    Results from initial plan were reviewed and interpreted by me revealing no concern for acute cardiac process, no concern for significant bacterial illness    Diagnostic information from other sources: Discussed patient's daughter at bedside reviewed past medical records    Interventions / Re-evaluation: Observed in emergency department for an hour and a half with no change in vital signs    Results/clinical rationale were discussed with patient at bedside    Consultations/Discussion of results with other physicians: Discussed negative workup in  emergency department, no concern for acute cardiac process, no concern for significant bacterial illness.  Did discuss possibility of a virus as etiology of patient's presentation today.  Have recommended oral hydration, Tylenol or Profen at home and follow-up with his primary care doctor to ensure resolution of symptoms.  Did also encourage him to follow with his primary care doctor to discuss his blood pressure there and keep a blood pressure diary to keep track of his blood pressure trends.  Given strict return precaution for any chest pain or shortness of breath.    Disposition plan: Discharge  -----        Final diagnoses:   Viral illness          Anselmo Sheehan MD  04/29/25 3951

## 2025-04-29 NOTE — DISCHARGE INSTRUCTIONS
You were evaluated for lightheadedness and malaise.  We got lab work and a chest x-ray that showed no concerns for any problems with your heart or significant bacterial illness.  We saw no concerns for any pneumonias.  You are now stable for discharge.  As we discussed, we believe your symptoms are likely related to a viral illness.  If you have any significant chest pain, shortness of breath, or weakness on 1 arm or 1 leg over the other unable to ambulate please come back to the to the Emergency Department for further evaluation.  You are now stable for discharge.